# Patient Record
Sex: MALE | Race: WHITE
[De-identification: names, ages, dates, MRNs, and addresses within clinical notes are randomized per-mention and may not be internally consistent; named-entity substitution may affect disease eponyms.]

---

## 2021-08-24 ENCOUNTER — HOSPITAL ENCOUNTER (OUTPATIENT)
Dept: HOSPITAL 61 - MRI | Age: 81
End: 2021-08-24
Payer: MEDICARE

## 2021-08-24 DIAGNOSIS — I72.5: Primary | ICD-10-CM

## 2021-08-24 DIAGNOSIS — M48.02: ICD-10-CM

## 2021-08-24 DIAGNOSIS — J43.9: ICD-10-CM

## 2021-08-24 DIAGNOSIS — R13.14: ICD-10-CM

## 2021-08-24 DIAGNOSIS — I65.23: ICD-10-CM

## 2021-08-24 DIAGNOSIS — I67.1: ICD-10-CM

## 2021-08-24 DIAGNOSIS — D18.09: ICD-10-CM

## 2021-08-24 DIAGNOSIS — I70.8: ICD-10-CM

## 2021-08-24 DIAGNOSIS — G31.9: ICD-10-CM

## 2021-08-24 DIAGNOSIS — G20: ICD-10-CM

## 2021-08-24 DIAGNOSIS — R91.1: ICD-10-CM

## 2021-08-24 DIAGNOSIS — M47.812: ICD-10-CM

## 2021-08-24 PROCEDURE — 70551 MRI BRAIN STEM W/O DYE: CPT

## 2021-08-24 PROCEDURE — 70498 CT ANGIOGRAPHY NECK: CPT

## 2021-08-24 PROCEDURE — 70496 CT ANGIOGRAPHY HEAD: CPT

## 2021-08-24 NOTE — RAD
EXAM: Head and neck CT angiogram with intravenous contrast.



HISTORY: Aneurysm.



TECHNIQUE: Computed tomographic images of the head and neck were obtained following the administratio
n of intravenous contrast. Three-dimensional reconstructed images were obtained.



*One or more of the following individualized dose reduction techniques were utilized for this examina
tion:  

1. Automated exposure control.  

2. Adjustment of the mA and/or kV according to patient size.  

3. Use of iterative reconstruction technique.



COMPARISON: None.



FINDINGS: The origins of the aortic arch great vessels are excluded from the field-of-view. There is 
atherosclerosis involving the visualized aortic arch and the proximal left subclavian artery. There i
s a 10 mm dissection flap within the proximal left subclavian artery. This is associated with less th
an 25 percent stenosis of the vessel lumen in this location. There is partially calcified atheroscler
otic plaque throughout the left subclavian artery. The remainder of visualized proximal arch great ve
ssels are widely patent.



The left carotid bifurcation is widely patent. There is partially calcified and vascular plaque invol
ving the proximal right internal carotid artery, resulting in approximately 56 9 percent stenosis at 
the vessel origin. There is slight ectasia of the cavernous right internal carotid artery, measuring 
6 mm in caliber compared to a measurement of 5 mm on the left. There is atherosclerosis involving the
 supraclinoid internal carotid arteries, with less than 50 percent stenosis.



There is a saccular aneurysm involving the anterior communicating artery. This measures 6 mm in maxim
um dimension and originates at the junction of the right A1 segment and A2 segment. No additional ane
urysm is seen. The anterior, middle and posterior cerebral arteries are widely patent. The basilar ar
ana [place] is widely patent. The left vertebral artery is dominant.



There is no mass effect or midline shift. There is no hydrocephalus. There is cerebral atrophy and th
ere are cerebral white matter changes likely due to chronic small vessel disease. There is evidence o
f lens surgery. There are maxillary sinus mucous retention cysts. There is minimal mastoid fluid. The
re are degenerative changes involving the cervical spine. This is associated with mild right foramina
l stenosis at C4-C5 and C5-C6. There are few osseous hemangiomas. There is no acute osseous finding. 
There is no neck lymphadenopathy. There is mild pulmonary emphysema. There are pleural-based nodular 
opacities at the left lung apex measuring up to 4 mm, likely due to aforementioned scarring. 



IMPRESSION:

1. 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

2. 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
sis of the vessel lumen.

3. Partially calcified atherosclerotic plaque involving the proximal right ICA, resulting in 50-69 pe
rcent stenosis. No additional hemodynamically significant stenosis is seen involving the neck or intr
acranial arteries.

4. Cerebral white matter changes, likely due to chronic small vessel disease.

5. Cerebral atrophy.

6. Small pleural-based nodular opacities at the left lung apex, likely due to adjacent scarring. Foll
ow-up can be performed in one year if there are risk factors for pulmonary neoplasm.

7. Mild pulmonary emphysema.





PQRS Compliance Statement - Stenosis calculations for CT, MR and conventional angiography are based u
danuta measurement of the distal ICA diameter in accordance with the NASCET methodology.  Stenosis calcu
lations for carotid ultrasound studies are derived from validated velocity criteria which are known t
o correlate with the NASCET methodology.



Electronically signed by: Lula Love MD (8/24/2021 4:07 PM) [id number]

## 2021-08-24 NOTE — RAD
EXAM: Brain MRI without contrast.



HISTORY: Parkinson's disease.



TECHNIQUE: Multiplanar, multisequence magnetic resonance imaging of the brain was performed without c
ontrast.



COMPARISON: None.



FINDINGS: There is no restricted diffusion to suggest acute or subacute infarction. There is no mass 
effect or midline shift. There is cerebral volume loss. No suspicious white matter lesion is seen. Th
ere is evidence of lens surgery. There is mild paranasal sinus because of thickening. There are tiny 
maxillary sinus mucous retention cyst. There is minimal right mastoid fluid. There are normal flow vo
ids within the cerebral vessels. There is no suspicious calvarial lesion.



IMPRESSION: 

1. Cerebral volume loss.

2. No acute intracranial finding.

3. Please refer to the separate report for the CT angiogram of the head and neck on the same date for
 angiographic findings.



Electronically signed by: Lula Love MD (8/24/2021 1:03 PM) [id number]